# Patient Record
Sex: FEMALE | Race: WHITE | HISPANIC OR LATINO | URBAN - METROPOLITAN AREA
[De-identification: names, ages, dates, MRNs, and addresses within clinical notes are randomized per-mention and may not be internally consistent; named-entity substitution may affect disease eponyms.]

---
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
## 2023-09-05 ENCOUNTER — LAB (OUTPATIENT)
Dept: LAB | Facility: CLINIC | Age: 23
End: 2023-09-05
Payer: COMMERCIAL

## 2023-09-05 DIAGNOSIS — Z13.6 SCREENING FOR HEART DISEASE: ICD-10-CM

## 2023-09-05 DIAGNOSIS — Z13.0 ENCOUNTER FOR SICKLE-CELL SCREENING: ICD-10-CM

## 2023-09-05 PROCEDURE — 36415 COLL VENOUS BLD VENIPUNCTURE: CPT | Performed by: PATHOLOGY

## 2023-09-05 PROCEDURE — 83021 HEMOGLOBIN CHROMOTOGRAPHY: CPT | Mod: 90 | Performed by: PATHOLOGY

## 2023-09-05 PROCEDURE — 99000 SPECIMEN HANDLING OFFICE-LAB: CPT | Performed by: PATHOLOGY

## 2023-09-05 PROCEDURE — 93000 ELECTROCARDIOGRAM COMPLETE: CPT | Performed by: INTERNAL MEDICINE

## 2023-09-06 ENCOUNTER — OFFICE VISIT (OUTPATIENT)
Dept: ORTHOPEDICS | Facility: CLINIC | Age: 23
End: 2023-09-06
Payer: COMMERCIAL

## 2023-09-06 VITALS
WEIGHT: 160.8 LBS | DIASTOLIC BLOOD PRESSURE: 83 MMHG | SYSTOLIC BLOOD PRESSURE: 120 MMHG | HEART RATE: 65 BPM | BODY MASS INDEX: 23.02 KG/M2 | HEIGHT: 70 IN

## 2023-09-06 VITALS
DIASTOLIC BLOOD PRESSURE: 83 MMHG | BODY MASS INDEX: 23.02 KG/M2 | HEIGHT: 70 IN | SYSTOLIC BLOOD PRESSURE: 120 MMHG | WEIGHT: 160.8 LBS | HEART RATE: 65 BPM

## 2023-09-06 DIAGNOSIS — Z98.890 S/P SHOULDER SURGERY: Primary | ICD-10-CM

## 2023-09-06 DIAGNOSIS — Z02.5 SPORTS PHYSICAL: Primary | ICD-10-CM

## 2023-09-06 LAB — HGB S BLD QL: NEGATIVE

## 2023-09-06 NOTE — LETTER
9/6/2023      RE: Maira Eng  [address]  [place] [facility] [address]     Dear Colleague,    Thank you for referring your patient, Maira Eng, to the [facility]IN[facility]. Please see a copy of my visit note below.    Chief Complaint:  Postoperative visit left shoulder    Date of Surgery:  05/26/2023    History of Present Illness:  Donna is a 23-year-old incoming  who is now 3-1/2 months status post left shoulder arthroscopic posterior as well as anteroinferior labral repair.  This was done at an outside institution.  She is doing quite well.  She has no pain.  Her motion is improving.  She has no sensation of instability.    Physical Examination:  23-year-old female alert oriented no apparent distress.  Her range of motion reveals slight decreased forward flexion and external rotation and abduction.  4+/5 supraspinatus, 4+/5 external rotators, 5/5 subscapularis.  She has a negative Wasatch's.  Negative apprehension.  Negative posterior apprehension.    Impression:  3-1/2 months status post left shoulder arthroscopic posterior labral repair.  Donna is doing well.  This is her nonthrowing arm.  She has no instability.  We spent time discussing the rehab plan.  At this time I think we need to maximize her cuff and periscapular strengthening.  We can work on stretching her capsule a bit but I think her range of motion will be fine.    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  There is a possibility of reinjury or further damage to the shoulder joint that can affect her over her lifetime.  She understands and would like to proceed.  She understands that this is a pre-existing condition.  [facility] may not cover medical care related to this particular injury.    Plan:  Begin rehabilitation with her  and physical therapist here at the [facility].  Start some light throwing in 2 weeks.  If doing well, may swing  from the T in 4 weeks.  Follow-up with me in 6 to 7 weeks and will discuss live hitting.  Seen with Charlene Acosta ATC      Again, thank you for allowing me to participate in the care of your patient.      Sincerely,    Juan Alberto Elizalde MD

## 2023-09-06 NOTE — LETTER
"  9/6/2023      RE: Maira Eng  [address] [address]     Dear Colleague,    Thank you for referring your patient, Maira Eng, to the [facility] CLINIC. Please see a copy of my visit note below.    Maira Eng  Vitals: /83   Pulse 65   Ht 1.765 m (5' 9.5\")   Wt 72.9 kg (160 lb 12.8 oz)   BMI 23.41 kg/m    BMI= Body mass index is 23.41 kg/m .  Sport(s): Softball    Vision: Right Eye: 20/20 Left Eye: 20/13 Both Eyes: 20/13  Correction: glasses and contacts  Pupils: equal    Sickle Cell Trait: Discussed and Sickle Cell testing done previously; Results negative  Concussions: Concussion fact sheet reviewed. Student Athlete gave written and verbal agreement to report any suspected concussions.    General/Medical  Eyes/Vision: Normal  Ears/Hearing: Normal  Nose: Normal  Mouth/Dental: Normal  Throat: Normal  Thyroid: Normal  Lymph Nodes: Normal  Lungs: Normal  Abdomen: Normal  Genitourinary (males only, not performed if female): Normal  Hernia: Normal  Skin: Normal    Musculoskeletal/Orthopaedic  Neck/Cervical: Normal  Thoracic/Lumbar: Normal  Shoulder/Upper Arm: Abnormal - left shoulder: lacks full ROM: 160/160/50/T10 - cuff strength intact  Elbow/Forearm: Normal  Wrist/Hand/Fingers: Normal  Hip/Thigh: Normal  Knee/Patella: Normal  Lower Leg/Ankles: Normal  Foot/Toes: Normal    Cardiovascular Screening  RRR  Heart Murmur:No Grade: NA  Symmetric Femoral pulses: Yes    Stigmata of Marfan's Syndrome - if appropriate:  Not applicable    EKG WNL    TRIAD Risk Factors  Low EA withor without DE/ED  0   Low BMI  0   Delayed Menarche  0   Oligomenorrhea and/or Amenorrhea  0   Low BMD     Stress Reaction/Fracture  Specific Bone(s)  Other Bone  0         TRIAD Score   Risk Score Status  0   Cumulative Risk  0   Assessment  Cleared   Medical Plan         COMMENTS, RECOMMENDATIONS and PARTICIPATION STATUS  Cleared after completing evaluation/rehabilitation for: S/P left " shoulder arthroscopy for stabilization after recurrent subluxations and labral tear -consult with Dr. Elizalde  - will play one season here, previously at [employer] school next year at [employer]    MD SPEEDY Emerson was present for the entire visit.        Again, thank you for allowing me to participate in the care of your patient.      Sincerely,    Annie Cabrera MD

## 2023-09-06 NOTE — PROGRESS NOTES
"Maira Eng  Vitals: /83   Pulse 65   Ht 1.765 m (5' 9.5\")   Wt 72.9 kg (160 lb 12.8 oz)   BMI 23.41 kg/m    BMI= Body mass index is 23.41 kg/m .  Sport(s): Softball    Vision: Right Eye: 20/20 Left Eye: 20/13 Both Eyes: 20/13  Correction: glasses and contacts  Pupils: equal    Sickle Cell Trait: Discussed and Sickle Cell testing done previously; Results negative  Concussions: Concussion fact sheet reviewed. Student Athlete gave written and verbal agreement to report any suspected concussions.    General/Medical  Eyes/Vision: Normal  Ears/Hearing: Normal  Nose: Normal  Mouth/Dental: Normal  Throat: Normal  Thyroid: Normal  Lymph Nodes: Normal  Lungs: Normal  Abdomen: Normal  Genitourinary (males only, not performed if female): Normal  Hernia: Normal  Skin: Normal    Musculoskeletal/Orthopaedic  Neck/Cervical: Normal  Thoracic/Lumbar: Normal  Shoulder/Upper Arm: Abnormal - left shoulder: lacks full ROM: 160/160/50/T10 - cuff strength intact  Elbow/Forearm: Normal  Wrist/Hand/Fingers: Normal  Hip/Thigh: Normal  Knee/Patella: Normal  Lower Leg/Ankles: Normal  Foot/Toes: Normal    Cardiovascular Screening  RRR  Heart Murmur:No Grade: NA  Symmetric Femoral pulses: Yes    Stigmata of Marfan's Syndrome - if appropriate:  Not applicable    EKG WNL    TRIAD Risk Factors  Low EA withor without DE/ED  0   Low BMI  0   Delayed Menarche  0   Oligomenorrhea and/or Amenorrhea  0   Low BMD     Stress Reaction/Fracture  Specific Bone(s)  Other Bone  0         TRIAD Score   Risk Score Status  0   Cumulative Risk  0   Assessment  Cleared   Medical Plan         COMMENTS, RECOMMENDATIONS and PARTICIPATION STATUS  Cleared after completing evaluation/rehabilitation for: S/P left shoulder arthroscopy for stabilization after recurrent subluxations and labral tear -consult with Dr. 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

## 2023-09-07 LAB
ATRIAL RATE - MUSE: 60 BPM
DIASTOLIC BLOOD PRESSURE - MUSE: NORMAL MMHG
INTERPRETATION ECG - MUSE: NORMAL
P AXIS - MUSE: 23 DEGREES
PR INTERVAL - MUSE: 154 MS
QRS DURATION - MUSE: 88 MS
QT - MUSE: 416 MS
QTC - MUSE: 416 MS
R AXIS - MUSE: 85 DEGREES
SYSTOLIC BLOOD PRESSURE - MUSE: NORMAL MMHG
T AXIS - MUSE: 37 DEGREES
VENTRICULAR RATE- MUSE: 60 BPM

## 2023-09-09 NOTE — PROGRESS NOTES
Chief Complaint:  Postoperative visit left shoulder    Date of Surgery:  05/26/2023    History of Present Illness:  Donna is a 23-year-old incoming  who is now 3-1/2 months status post left shoulder arthroscopic posterior as well as anteroinferior labral repair.  This was done at an outside institution.  She is doing quite well.  She has no pain.  Her motion is improving.  She has no sensation of instability.    Physical Examination:  23-year-old female alert oriented no apparent distress.  Her range of motion reveals slight decreased forward flexion and external rotation and abduction.  4+/5 supraspinatus, 4+/5 external rotators, 5/5 subscapularis.  She has a negative Sevier's.  Negative apprehension.  Negative posterior apprehension.    Impression:  3-1/2 months status post left shoulder arthroscopic posterior labral repair.  Donna is doing well.  This is her nonthrowing arm.  She has no instability.  We spent time discussing the rehab plan.  At this time I think we need to maximize her cuff and periscapular strengthening.  We can work on stretching her capsule a bit but I think her range of motion will be fine.    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  There is a possibility of reinjury or further damage to the shoulder joint that can affect her over her lifetime.  She understands and would like to proceed.  She understands that this is a pre-existing condition.  [facility] may not cover medical care related to this particular injury.    Plan:  Begin rehabilitation with her  and physical therapist here at the [facility].  Start some light throwing in 2 weeks.  If doing well, may swing from the T in 4 weeks.  Follow-up with me in 6 to 7 weeks and will discuss live hitting.  Seen with Charlene Acosta, SPEEDY

## 2023-09-20 ENCOUNTER — DOCUMENTATION ONLY (OUTPATIENT)
Dept: PHYSICAL THERAPY | Facility: CLINIC | Age: 23
End: 2023-09-20
Payer: COMMERCIAL

## 2023-09-27 ENCOUNTER — DOCUMENTATION ONLY (OUTPATIENT)
Dept: PHYSICAL THERAPY | Facility: CLINIC | Age: 23
End: 2023-09-27
Payer: COMMERCIAL

## 2023-09-27 DIAGNOSIS — Z98.890 STATUS POST LABRAL REPAIR OF SHOULDER: Primary | ICD-10-CM

## 2023-09-27 DIAGNOSIS — M25.312 SHOULDER INSTABILITY, LEFT: ICD-10-CM

## 2023-09-27 NOTE — PROGRESS NOTES
[facility] ATHLETICS    Physical Therapy initial assessment note  Treating therapist: Bart Avila  Date of service performed: September 20, 2023  Sport: SB    Concern: Chronic injury  Body part: Shoulder  Description: Left  Injury: s post left shoulder arthroscopic posterior as well as anteroinferior labral repair   Type: Athletics related  Date of injury: 5/26/23    S: 3 months 3 weeks post surgery. Reports feeling good with min pain. Feels like she could get back to doing more sport specific activity. Has restrictions for weighted UE lifting and any swinging/catching/throwing movements (may start light throwing next week). Is working with ATC 5 days/week including BFR.     O:   ROM: limited in last 10 deg in flex and abduct without discomfort/stretch on L.  MMT: 4+/5 in flex, abduct, and ER. Fatigues with high rep ex's in 90 or above deg flexion/abduction   Scapular observation: L early upward rotation and inferior winging during downward rotation. Slight scapular winging in static position.     A: athlete is 3 mo 3 weeks post labral repair and presents with decreased static strength and easily fatigued OH strength at her RTC on the left. No pain or swelling noted during exam and pt reports moderate weakness/fatigue level after session today.     P: PT 1x/week including working in WB, OH strength and endurance, and progressing into weighted lifts in safe ranges.     BART AVILA, PT

## 2023-10-04 NOTE — PROGRESS NOTES
[facility] ATHLETICS    Physical Therapy assessment note  Treating therapist: Bart Avila  Date of service performed: October 4, 2023  Sport: softball    Concern: 4 months s/p left shoulder arthroscopic posterior and anteroinferior labral repair.  Body part: Shoulder  Description: Left  Injury: Tear  Type: Athletics related  Date of injury: 5/26/23 DOS    S: athlete here for re-eval of strength and progress of PT. Reports feeling good and is eager to start throwing/catching and lifting    O:     Scapular mobility screen: L early downward rotation with scapular winging with fwd flexion and scaption movements. Difficulty with scapular depression bilat (L worse than R).     SHOULDER STRENGTH (HHD measured in peak pound force)  HHD ER Neutral Belly Press or Base IR   Left   Trial 1: 16  Trial 2: 18  Pain: Negative Trial 1: 19  Trial 2: 21  Pain: Negative   Right Trial 1: 17  Trial 2: 18  Pain: Negative Trial 1: 15  Trial 2: 19  Pain: Negative       HHD Abduction Flexion   Left   Trial 1: 17  Trial 2: 15  Position: 90 deg  Pain: Negative Trial 1: 19  Trial 2: 18  Pain: Negative   Right Trial 1: 14  Trial 2: 14  Position: 90 deg  Pain: Negative Trial 1: 17  Trial 2: 19  Pain: Negative       HHD (prone) 90-90 ER 90-90 IR   Left Trial 1: 15  Trial 2: 13  Pain: none Trial 1: 13  Trial 2: 16  Pain: none   Right Trial 1: 18  Trial 2: 18  Pain: none Trial 1: 16  Trial 2: 18  Pain: none       A: athletes strength tests similar to contralateral limb without pain. Has scapular dyskinesis on L side with downward reaching.     P: con with overhead strengthening as tolerated and scapular retraining.     BART AVILA, PT

## 2023-10-04 NOTE — PROGRESS NOTES
[facility] ATHLETICS    Athlete Physical Therapy Plan  Treating Therapist: Bart Eng  Injury requiring rehab: L shoulder s/p arthroscopic posterior and anteroinferior labral repair.     Below is the rehab calendar for the week of 9/27-10/4.      9/27 10/4   Shoulder flex perts GTB 2 x 6   ER 90/90 badned 2 x 12  OH Press banded (ant pull) 2 x 12  Serratus plus 1/2 plank 2 x 30 sec  Weight shift in plank x 30 sec  Lat pulldown SA @ 19 x 12   Body blade variations 4 x 30 sec Shoulder strength testing (see note)  Shoulder flexion 4# B 2 x 10  Body blade variations 4 x 20-30 sec  Scap depression @ 20 2 x 10  Lat pulldown SA @ 20 2 x 8       BART MCGEE, PT

## 2023-10-06 ENCOUNTER — DOCUMENTATION ONLY (OUTPATIENT)
Dept: FAMILY MEDICINE | Facility: CLINIC | Age: 23
End: 2023-10-06

## 2023-10-06 NOTE — PROGRESS NOTES
[facility] ATHLETICS  Iris ATC follow-up note  Date of service performed: 9/20/23    Concern/injury: L Shoulder    Assessment/plan: Athlete is progressing well in her rehab. Continue to work terminal flexion through stretching and exercises.         Taran Baker, ATC

## 2023-10-06 NOTE — PROGRESS NOTES
[facility] ATHLETICS  Iris ATC follow-up note  Date of service performed: 10/1/23    Concern/injury: L Shoulder    Assessment/plan: Aleksandra continues to be pain free. She is progressing well and we are transitioning to more proprioceptive based exercises. Continue with rehab and PT.        Taran Baker, ATC

## 2023-10-06 NOTE — PROGRESS NOTES
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 Removed from a labral repair of her L shoulder. Aleksandra established care with Dr. Frank Elizalde at the [facility]. Dr. Elizalde would like Aleksandra to continuing with cuff rehab to improve her strength.     The plan for Aleksandra is to continue cuff strengthening for two weeks and then she can begin to work on throwing and catching within her frame of body. At 5 mo. Aleksandra will begin a return to hitting. After two weeks she can begin machine swings.     Aleksandra will have a follow up with Dr Elizalde in 6 weeks.     Taran Baker, ATC

## 2023-10-06 NOTE — PROGRESS NOTES
[facility] ATHLETICS  Iris ATC follow-up note  Date of service performed: 9/13/23    Concern/injury: L SHoulder    Assessment/plan: Aleksandra is tolerating rehab and progressing well with her exercises. Athlete has also begun seeing PT once per week. Continue exercises as tolerated. Athlete may participate in weights without the use of her L arm as well as biking and running.       Taran Baker, ATC

## 2023-10-18 ENCOUNTER — OFFICE VISIT (OUTPATIENT)
Dept: ORTHOPEDICS | Facility: CLINIC | Age: 23
End: 2023-10-18
Payer: COMMERCIAL

## 2023-10-18 VITALS
SYSTOLIC BLOOD PRESSURE: 119 MMHG | HEART RATE: 64 BPM | BODY MASS INDEX: 23.86 KG/M2 | WEIGHT: 163.9 LBS | DIASTOLIC BLOOD PRESSURE: 79 MMHG

## 2023-10-18 DIAGNOSIS — Z98.890 S/P SHOULDER SURGERY: Primary | ICD-10-CM

## 2023-10-18 NOTE — LETTER
10/18/2023      RE: Maira Eng  [address] [facility] [address]     Dear Colleague,    Thank you for referring your patient, Maira Eng, to the [facility] CLIN[facility]. Please see a copy of my visit note below.    Chief Complaint:  Postoperative visit left shoulder    Date of Surgery:  05/26/2023    History of Present Illness:  Donna is a 23-year-old  who is now 5 months status post left shoulder posterior labral repair.  She is done at an outside institution.  She is doing very well.  She has no instability.  No pain.  She does get some slight soreness.  She has been doing some light swinging of the bat.    Physical Examination:  23-year-old female alert oriented no apparent distress.  Her range of motion is symmetrical.  Her rotator cuff strength is 5/5.  She has a negative apprehension.  Negative posterior apprehension.    Impression:  5 months status post left shoulder arthroscopic posterior Bankart repair.  Doing well.  We had a nice discussion about progression.  I think she can start hitting some light pitching.  She can start doing some catching.  I do not want her doing any live softball participation yet however.  We discussed restrictions in the weight room.    Plan:  Continue to gradually advance her softball activity.  Continue rehabilitation with her athletic training staff  Follow-up with me in 8 weeks for routine recheck.  I think at that point she will essentially be ready for full competitive softball if things are going well.  Seen with Charlene Acosta, ATC    20 minutes was spent on the date of the encounter doing chart review, patient visit, and documentation     Again, thank you for allowing me to participate in the care of your patient.      Sincerely,    Juan Alberto Elizalde MD

## 2023-10-26 NOTE — PROGRESS NOTES
Chief Complaint:  Postoperative visit left shoulder    Date of Surgery:  05/26/2023    History of Present Illness:  Donna is a 23-year-old  who is now 5 months status post left shoulder posterior labral repair.  She is done at an outside institution.  She is doing very well.  She has no instability.  No pain.  She does get some slight soreness.  She has been doing some light swinging of the bat.    Physical Examination:  23-year-old female alert oriented no apparent distress.  Her range of motion is symmetrical.  Her rotator cuff strength is 5/5.  She has a negative apprehension.  Negative posterior apprehension.    Impression:  5 months status post left shoulder arthroscopic posterior Bankart repair.  Doing well.  We had a nice discussion about progression.  I think she can start hitting some light pitching.  She can start doing some catching.  I do not want her doing any live softball participation yet however.  We discussed restrictions in the weight room.    Plan:  Continue to gradually advance her softball activity.  Continue rehabilitation with her athletic training staff  Follow-up with me in 8 weeks for routine recheck.  I think at that point she will essentially be ready for full competitive softball if things are going well.  Seen with Charlene Acosta, ATC    20 minutes was spent on the date of the encounter doing chart review, patient visit, and documentation

## 2023-12-06 ENCOUNTER — OFFICE VISIT (OUTPATIENT)
Dept: ORTHOPEDICS | Facility: CLINIC | Age: 23
End: 2023-12-06
Payer: COMMERCIAL

## 2023-12-06 VITALS — BODY MASS INDEX: 23.7 KG/M2 | WEIGHT: 160 LBS | HEIGHT: 69 IN

## 2023-12-06 DIAGNOSIS — Z98.890 S/P SHOULDER SURGERY: Primary | ICD-10-CM

## 2023-12-06 NOTE — LETTER
"  12/6/2023      RE: Maira Eng  [address] [address]     Dear Colleague,    Thank you for referring your patient, Maira Eng, to the [facility]. Please see a copy of my visit note below.    Chief Complaint:  Postoperative visit left shoulder     Date of Surgery:  05/26/2023     History of Present Illness:  Donna is a 23-year-old  who is now 6-1/2 months status post left shoulder posterior labral repair.  She is doing well.  Ranks her shoulder is an 85-90.  She is swinging the bat and this is going well.  Her shoulder feels stable.  She just feels a bit \"off\".  Shoulder is not normal but she is quite functional.     Physical Examination:  23-year-old female alert oriented no apparent distress.  Her range of motion is symmetrical.  Her rotator cuff strength is 5/5.  She has a negative apprehension.  Negative posterior apprehension.     Impression:  6.5 months status post left shoulder arthroscopic posterior Bankart repair.  Doing well.  At this point I think we can advance her hitting.  She can hit live pitching.  I am fine with her fielding and throwing which should not be an issue.  I do think she would be ready for live softball when the team goes for their training trip.  She is also comfortable with this plan.      Plan:  Continue unarmed care program  Continue advance softball activity.  She is cleared to advance her softball activity per her .  I think should be ready for live softball for the team's training trip in January.  She understands that returning does bring in risk of reinjury.    Seen with Charlene Acosta, ATC     20 minutes was spent on the date of the encounter doing chart review, patient visit, and documentation       Again, thank you for allowing me to participate in the care of your patient.      Sincerely,    Juan Alberto Elizalde MD  "

## 2023-12-09 NOTE — PROGRESS NOTES
"Chief Complaint:  Postoperative visit left shoulder     Date of Surgery:  05/26/2023     History of Present Illness:  Donna is a 23-year-old  who is now 6-1/2 months status post left shoulder posterior labral repair.  She is doing well.  Ranks her shoulder is an 85-90.  She is swinging the bat and this is going well.  Her shoulder feels stable.  She just feels a bit \"off\".  Shoulder is not normal but she is quite functional.     Physical Examination:  23-year-old female alert oriented no apparent distress.  Her range of motion is symmetrical.  Her rotator cuff strength is 5/5.  She has a negative apprehension.  Negative posterior apprehension.     Impression:  6.5 months status post left shoulder arthroscopic posterior Bankart repair.  Doing well.  At this point I think we can advance her hitting.  She can hit live pitching.  I am fine with her fielding and throwing which should not be an issue.  I do think she would be ready for live softball when the team goes for their training trip.  She is also comfortable with this plan.      Plan:  Continue unarmed care program  Continue advance softball activity.  She is cleared to advance her softball activity per her .  I think should be ready for live softball for the team's training trip in January.  She understands that returning does bring in risk of reinjury.    Seen with Charlene Acosta, ATC     20 minutes was spent on the date of the encounter doing chart review, patient visit, and documentation   "

## 2024-02-01 ENCOUNTER — DOCUMENTATION ONLY (OUTPATIENT)
Dept: FAMILY MEDICINE | Facility: CLINIC | Age: 24
End: 2024-02-01

## 2024-02-01 NOTE — PROGRESS NOTES
"[facility] ATHLETIC MEDICINE  [facility]   Sport Psychology Progress Note      Location of Visit: [facility] Athletic Department [facility] [address]  Date of Visit: February 1st, 2024 9am   Duration of Session: 45-50 minutes     Emergency contacts provided:  General:Zofia Eng (Mother)  [phone] (Home Phone)  In-person:  Taran Acosta (SB AT)  [phone] (cell)     Suicide Assessment:  Recent suicidal thoughts: No  Past suicidal thoughts: No  Any attempts in the past: No  Any family/friends/loved ones die by suicide: No  Plan or considering various methods: No  Access to guns: No  Protective factors: no h/o suicide attempt, no plan or intent, h/o seeking help when needed, future oriented, feeling hopeful, commitment to family and good social support    Verbal contract for safety: Yes: Clt consented to safety of self and to others.     Mental Status & Observations:  Maira appeared generally alert and oriented. Dress was appropriate to the weather and occasion. Grooming and hygiene were appropriate. Eye contact was good. Speech was of normal volume and normal. Mood was appropriate with congruent affect. Thought processes were relevant, logical and goal-directed. Thought content was within normal limits with no evidence of psychotic or paranoid features. Memory appeared intact. Insight and judgment appeared age appropriate with good focus in session.  She exhibited normal motor activity during the appointment.  Behavior was actively engaged, candid, congenial, cooperative, engaging, open and relaxed.      Observations and response to counseling:  Clt arrived on time for the session and appeared more relaxed than previous session.     Intervention:  Clt was provided empathetic listening, Clt Centered Therapy, Interpersonal Therapy, and performance coaching as she discussed her evolving relationship with her sport demands currently and how she is handling a \"low period\". Clt " "stated that her follow up with her position  was successful, productive, corrective, and informative as she was made aware of the the 's intent when informing the HC of the Clt's feelings of being \"burntout\". Theodora endorsed that she also learned that many of her peers/teammates feel the same way which is way a few practices and engagements have been different and fun. Theodora explored the relationships she currently misses that have esteemed her intimately during the fall semester that she doesn't have immediate access to now (dog, parents, boyfriend). Theodora identified that the past 1.5 years have been more emotionally heavy for her (injury, rehab, senior year, current graduate year) which impacted her relationship with SB as she has lost some interest and excitement with the sport. Theodora was encouraged to continue naming her feelings to emotionally regulate for practices and competitions; which will further increase her performance confidence too. 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       Therapy objectives/goals:  Build resilience and response to adversity  Decrease anxiety symptoms  Decrease depressive symptoms  Decrease perceived stress  Enhance self-care  Increase assertiveness  Improve mood  Improve sport performance  Provide support  Separate self-worth from outcome/achievement  Support injury/recovery  Teach and improve coping skills    Therapy follow-up plan:  Individual counseling sessions as needed      ALMA ROSA LEI PsyD  "

## 2024-02-08 ENCOUNTER — DOCUMENTATION ONLY (OUTPATIENT)
Dept: FAMILY MEDICINE | Facility: CLINIC | Age: 24
End: 2024-02-08

## 2024-02-09 NOTE — PROGRESS NOTES
[facility] ATHLETIC MEDICINE  [facility]   Sport Psychology Progress Note      Location of Visit: [facility] Athletic Department [facility] 293  Date of Visit: February 8th, 2024 10am   Duration of Session: 45-50 minutes     Emergency contacts provided:  General:Zofia Eng (Mother)  [phone] (Home Phone)  In-person:  Taran Acosta (SB AT)  [phone] (cell)      Suicide Assessment:  Recent suicidal thoughts: No  Past suicidal thoughts: No  Any attempts in the past: No  Any family/friends/loved ones die by suicide: No  Plan or considering various methods: No  Access to guns: No  Protective factors: no h/o suicide attempt, no plan or intent, h/o seeking help when needed, future oriented, feeling hopeful, commitment to family and good social support    Verbal contract for safety: Yes: Clt consented to safety of self and to others.      Mental Status & Observations:  Maira appeared generally alert and oriented. Dress was appropriate to the weather and occasion. Grooming and hygiene were appropriate. Eye contact was good. Speech was of normal volume and normal. Mood was appropriate with congruent affect. Thought processes were relevant, logical and goal-directed. Thought content was within normal limits with no evidence of psychotic or paranoid features. Memory appeared intact. Insight and judgment appeared age appropriate with good focus in session.  She exhibited normal motor activity during the appointment.  Behavior was actively engaged, candid, congenial, cooperative, engaging, open and relaxed.       Observations and response to counseling:  Clt arrived a few minutes late to the session due to early morning hitting practice before their afternoon travels.     Intervention:  Clt was provided empathetic listening, Clt Centered Therapy, Trauma Informed Therapy, performance coaching, & ACT as she expressed and explored her hx at her former institution when she experienced  "her injury. Theodora was able to identify that she is still has negative sentiments about her exiting experience and how she secured surgery & rehab eventually for her torn labrum. Theodora detailed her memories and narrative from the experience noting that her father was a big part of her agency as they both navigated the team of providers. Theodora examined the \"Anger Iceberg\" and identified that she still feels (and felt) disappointed, sad, doubtful, overwhelmed, jealous, and insecure about her senior year which she is still processing now. 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 Theodora was encouraged to continue processing her experiences at a pace to ensure performance readiness, especially as she transitions to her first road game in the afternoon.     Therapy objectives/goals:  Build resilience and response to adversity  Decrease anxiety symptoms  Decrease depressive symptoms  Decrease eating disorder  Enhance life balance  Enhance self-advocacy  Enhance self-care  Increase assertiveness  Increase self-awareness  Increase self-esteem and self-worth  Improve communication skills  Improve interpersonal relationships  Improve mood  Improve sport performance  Provide support  Separate self-worth from outcome/achievement  Support injury/recovery  Teach and improve coping skills    Therapy follow-up plan:  Individual counseling sessions as needed      ALMA ROSA LEI PsyD  "

## 2024-02-14 ENCOUNTER — DOCUMENTATION ONLY (OUTPATIENT)
Dept: FAMILY MEDICINE | Facility: CLINIC | Age: 24
End: 2024-02-14

## 2024-02-20 NOTE — PROGRESS NOTES
[facility] ATHLETICS  [facility] rehab calendar    Maira Eng  Injury requiring rehab: L Shoulder    Below is the rehab calendar for the week of 10/15/23.    Sun Mon Tues Wed Thurs Fri Sat    Arm Bike 5min  Sleeper stretch [id number]  Bat Stretches 10x   Is,Ys,Ws BFR  Prone Ts BFR  90/90s BFR  Shoulder stabilization [id number] Arm Bike 5min  Sleeper Stretch 3x20s  Bat [id number]  JT Mobs  Is,Ys,[id number]  Prone Ts 3x10  Banded Bear Hugs 3x12  90/90 3x10  Rev wall slide band 3x10  Shoulder stab [id number] PT Arm Bike 5min  Sleeper Stretch 3x20s  Bat [id number]  JT Mobs  Is,Ys,[id number]  Prone Ts 3x10  Banded Bear Hugs 3x12  90/90 3x10  Rev wall slide band 3x10  Shoulder stab 0i07teb PT    Athlete tolerated rehab  Exercises utilizing BFR follow [id number] rep ranges     Taran Baker, ATC

## 2024-02-20 NOTE — PROGRESS NOTES
[facility] ATHLETICS  [facility] re[facility] calendar    Maira Eng  Injury requiring rehab: L Shoulder    Below is the rehab calendar for the week of 10/8/23.    Sun Mon Tues Wed Thurs Fri Sat   Arm Bike 5min  Sleeper Stretch [id number]  Bat stretches 10ea  JT Mobs  Is,Ys,Ws 2x10ea  Prone Ts 3x10-12  Banded Bear Hugs 3x12-15  90/90 3x10  Rev wall slide band 3x10  Shoulder stab (ball on wall) 2m54maz  Serratus Pull over 3x10-12 Arm Bike 5min  Sleeper Stretch 5d09brq  Bat stretches 10ea  Is,Ys,Ws BFR  Prone Ts BFR  Banded Bear Hugs 3x12-15  90/90 BFR    PT Arm Bike 5min  Sleeper Stretch 0s21cwi  Bat stretches 10ea  Is,Ys,Ws BFR  Prone Ts BFR  Banded Bear Hugs 3x12-15  90/90 BFR PT    Athlete tolerated treatment.  Exercises utilizing BFR follow [id number] rep ranges    Taran Baker, ATC

## 2024-02-22 ENCOUNTER — DOCUMENTATION ONLY (OUTPATIENT)
Dept: FAMILY MEDICINE | Facility: CLINIC | Age: 24
End: 2024-02-22

## 2024-02-22 NOTE — PROGRESS NOTES
[facility] ATHLETICS  [facility] re[facility] calendar    Maira Eng  Injury requiring rehab: L Shoulder    Below is the rehab calendar for the week of 10/23/23.    Sun Mon Tues Wed Thurs Fri Sat    PT Jt mobs  Is,Ys, Ts BFR  Prone Ts BFR  90/90 ER w/ press BFR PT Quadreped push plus 3x12  Banded ER/IR walk out 3x8  Is,Ys,Ts 3x10  Prone Snow [place] 3x8 IR/ER BFR  Banded As BFR  Prone Row BFR  DB Ys Standing BFR    Athlete tolerated rehab exercises    Taran Baker, ATC

## 2024-02-22 NOTE — PROGRESS NOTES
[facility] ATHLETICS  [facility] re[facility] calendar    Maira Eng  Injury requiring rehab: L Shoulder    Below is the rehab calendar for the week of 10/30/21.    Sun Mon Tues Wed Thurs Fri Sat    PT Quad push plus 3x12  ER/IR walkout 3x8  Is,Ys,Ts 3x10  Snow [place] 3x8  Bear Hugs 3x10  OH lift off 3x10   PT Quad push plus 3x12  ER/IR walkout 3x8  Is,Ys,Ts 3x10  Snow [place] 3x8  Bear Hugs 3x10  OH lift off 3x10 IR/ER BFR  Banded As BFR  Prone Row BFR  DB Ys BFR        Taran Baker, ATC

## 2024-02-22 NOTE — PROGRESS NOTES
[facility] ATHLETICS  [facility] re[facility] calendar    Maira Eng  Injury requiring rehab: L Shoulder    Below is the rehab calendar for the week of 11/6/23.    Sun Mon Tues Wed Thurs Fri Sat    Push Plus 3x12  ER/IR walkout 3x8  Is,Ys,Ts 3x10  Snow [place] 3x8  Bear Hugs 3x10  OH lift off 3x10  Banded wall slide lift off 3x10  D1/d2 patterns 3x10  Proprioception [id number]  Serratus pullovers 3x10 IR/ER BFR  Banded As BFR  Prone Row BFR  Dbys BFR PT Push plus 3x12  ER/IR walkout 3x8  Bear hugs 3x10  OH lift off 3x10  Serratus pullover 3x10  Proprioception 0s80tnb  IR/ER 2x10  As 2x10  Prone rows 2x10  DB ys 2x10 IR/ER BFR  As BFR  Prone Row BFR  DB Ys BFR        Taran Rajput-Dave, ATC

## 2024-02-22 NOTE — PROGRESS NOTES
[facility] ATHLETICS  [facility] ATC follow-up note  Date of service performed: 10/23/23    Concern/injury: L Shoulder    Assessment/plan: Aleksandra has begun return to hitting. Hitting includes R arm drill work, 25 dry swings at 60% effort and 30 keenan swings 60% effort. Aleksandra tolerated hitting and will continue to progress from tees and dry swings to tees and front toss. No difficulties reported    Taran Baker, ATC

## 2024-02-22 NOTE — PROGRESS NOTES
[facility] ATHLETICS  [facility] ATC follow-up note  Date of service performed: 11/9/23    Concern/injury: L Shoulder    Assessment/plan: Aleksandra is continuing to progress well through hitting. Continue as tolerated. Athlete is also tolerating rehab exercises.     Taran Baker, ATC

## 2024-03-03 NOTE — PROGRESS NOTES
[facility] ATHLETIC MEDICINE  [facility]   Sport Psychology Progress Note      Location of Visit: [facility] Athletic Department [address]  Date of Visit: February 22nd. 2024 7am  Duration of Session: 45-50 minutes     Emergency contacts provided:  General:Zofia Eng (Mother)  [phone] (Home Phone)  In-person:  CharleneBrennaCarlyn Acosta (SB AT)  [phone] (cell)      Suicide Assessment:  Recent suicidal thoughts: No  Past suicidal thoughts: No  Any attempts in the past: No  Any family/friends/loved ones die by suicide: No  Plan or considering various methods: No  Access to guns: No  Protective factors: no h/o suicide attempt, no plan or intent, h/o seeking help when needed, future oriented, feeling hopeful, commitment to family and good social support    Verbal contract for safety: Yes: Clt consented to safety of self and to others.      Mental Status & Observations:  Maira appeared generally alert and oriented. Dress was appropriate to the weather and occasion. Grooming and hygiene were appropriate. Eye contact was good. Speech was of normal volume and normal. Mood was appropriate with congruent affect. Thought processes were relevant, logical and goal-directed. Thought content was within normal limits with no evidence of psychotic or paranoid features. Memory appeared intact. Insight and judgment appeared age appropriate with good focus in session.  She exhibited normal motor activity during the appointment.  Behavior was actively engaged, candid, congenial, cooperative, engaging, open and relaxed.      Observations and response to counseling:  Theodora arrived on time for the session and appeared upset after coming directly from her sport practice.     Intervention:  Clt was provided empathetic listening, Clt Centered Therapy, Solutions Focused Therapy, ACT, and performance coaching as she discussed still feeling shoulder pain and being upset with how it's impacting her  projected post graduation plans. Theodora became tearful as she continued to process her feelings yet unsuccessfully identifying a satisfactory resolve. 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 Theodora remained tearfully emotive as she expressed unhappiness and a sense of defeat due to still experiencing pain which she did not expect to feel due to her successful surgery and rehab. Theodora described how she was doing different things with current rehab, but the occurrence of her healing being prolonged has been extremely upsetting for her. Theodora continued to cry in silence as she was unable to speak for a moment. Theodora eventually shared that she'd been given participation modifications which ultimately she didn't know if she could fully committee to either suggestion. Theodora was encouraged to continue to think about all of her options and to consider her desired support that will aid her in making the best decision for herself. Theodora was reminded that Sport Psychology was here to support her with whichever decision she made.     Therapy objectives/goals:  Build resilience and response to adversity  Decrease anxiety symptoms  Decrease depressive symptoms  Enhance life balance  Enhance self-advocacy  Enhance self-care  Increase assertiveness  Increase self-awareness  Improve communication skills  Improve interpersonal relationships  Improve mood  Improve sport performance  Provide support  Support injury/recovery  Teach and improve coping skills    Therapy follow-up plan:  Individual counseling sessions weekly  Individual counseling sessions as needed      ALMA ROSA LEI PsyD

## 2024-03-03 NOTE — PROGRESS NOTES
[facility] ATHLETIC MEDICINE  [facility]   Sport Psychology Progress Note      Location of Visit: [facility] Athletic Department [facility] [address]  Date of Visit: February 14th, 2024 10am  Duration of Session: 45-50 minutes    Emergency contacts provided:  General:Zofia Eng (Mother)  [phone] (Home Phone)  In-person:  Taran Acosta (SB AT)  [phone] (cell)      Suicide Assessment:  Recent suicidal thoughts: No  Past suicidal thoughts: No  Any attempts in the past: No  Any family/friends/loved ones die by suicide: No  Plan or considering various methods: No  Access to guns: No  Protective factors: no h/o suicide attempt, no plan or intent, h/o seeking help when needed, future oriented, feeling hopeful, commitment to family and good social support    Verbal contract for safety: Yes: Clt consented to safety of self and to others.      Mental Status & Observations:  Maira appeared generally alert and oriented. Dress was appropriate to the weather and occasion. Grooming and hygiene were appropriate. Eye contact was good. Speech was of normal volume and normal. Mood was appropriate with congruent affect. Thought processes were relevant, logical and goal-directed. Thought content was within normal limits with no evidence of psychotic or paranoid features. Memory appeared intact. Insight and judgment appeared age appropriate with good focus in session.  She exhibited normal motor activity during the appointment.  Behavior was actively engaged, candid, congenial, cooperative, engaging, open and relaxed.    Observations and response to counseling:  Yemit arrived on time for the session and responded well to the session as she was provided an opportunity to discuss and explore her recent experiences with her should pain and sport participation.     Intervention:  Clt was provided empathetic listening, Clt Centered Therapy, Solutions Focused Therapy, Emotions Focused Therapy, and  ACT as she described her negative feelings regarding still experiencing pain in her shoulder than she previously expected and had prepared for. Theodora identified her sadness and feelings of defeat due to her pain management and feeling as if she doesn't enjoy her sport anymore. Theodora wished her parents and her dog were with her to support her during this time and also mentioned that she'd spent some supportive time with her boyfriend which helped. Theodora became tearful as she processed the impact of her pain as it and the prior surgery compromises her chances/desires to transition to [employer] in graduate school. Theodora briefly mentioned her considerations to end her career earlier, but doesn't know what the financial ramifications will be, especially if she has to drop her classes.  Theodora was encouraged to utilize all of her resources at [facility] to lessen the pain, assess her ROM and abilities, and ask more questions (and assistance) given she is surrounded by many proivders that want to support her wellness; and to have a conversation with Compliance if she needed answers to some questions.     Therapy objectives/goals:  Build resilience and response to adversity  Decrease anxiety symptoms  Decrease depressive symptoms  Enhance life balance  Enhance self-advocacy  Enhance self-care  Increase assertiveness  Increase self-awareness  Improve communication skills  Improve interpersonal relationships  Improve mood  Improve sport performance  Provide support  Support injury/recovery  Teach and improve coping skills    Therapy follow-up plan:  Individual counseling sessions weekly      ALMA ROSA LEI PsyD

## 2024-09-22 ENCOUNTER — V-VISIT (OUTPATIENT)
Dept: URGENT CARE | Age: 24
End: 2024-09-22

## 2024-09-22 VITALS
TEMPERATURE: 98.1 F | RESPIRATION RATE: 19 BRPM | DIASTOLIC BLOOD PRESSURE: 74 MMHG | HEIGHT: 69 IN | BODY MASS INDEX: 23.05 KG/M2 | SYSTOLIC BLOOD PRESSURE: 108 MMHG | WEIGHT: 155.65 LBS | HEART RATE: 83 BPM | OXYGEN SATURATION: 98 %

## 2024-09-22 DIAGNOSIS — J02.0 ACUTE STREPTOCOCCAL PHARYNGITIS: Primary | ICD-10-CM

## 2024-09-22 DIAGNOSIS — J02.9 SORE THROAT: ICD-10-CM

## 2024-09-22 LAB
FLUAV AG UPPER RESP QL IA.RAPID: NEGATIVE
FLUBV AG UPPER RESP QL IA.RAPID: NEGATIVE
INTERNAL PROCEDURAL CONTROLS ACCEPTABLE: YES
S PYO AG THROAT QL IA.RAPID: POSITIVE
SARS-COV+SARS-COV-2 AG RESP QL IA.RAPID: NOT DETECTED
TEST LOT EXPIRATION DATE: ABNORMAL
TEST LOT EXPIRATION DATE: NORMAL
TEST LOT NUMBER: ABNORMAL
TEST LOT NUMBER: NORMAL

## 2024-09-22 RX ORDER — AMOXICILLIN 875 MG
TABLET ORAL
Qty: 20 TABLET | Refills: 0 | Status: SHIPPED | OUTPATIENT
Start: 2024-09-22 | End: 2024-10-03

## 2024-09-22 RX ORDER — LIDOCAINE HYDROCHLORIDE 20 MG/ML
SOLUTION OROPHARYNGEAL
Qty: 200 ML | Refills: 1 | Status: SHIPPED | OUTPATIENT
Start: 2024-09-22 | End: 2024-10-07